# Patient Record
Sex: FEMALE | Race: BLACK OR AFRICAN AMERICAN | Employment: PART TIME | URBAN - METROPOLITAN AREA
[De-identification: names, ages, dates, MRNs, and addresses within clinical notes are randomized per-mention and may not be internally consistent; named-entity substitution may affect disease eponyms.]

---

## 2017-01-13 ENCOUNTER — DOCUMENTATION ONLY (OUTPATIENT)
Dept: NEUROLOGY | Age: 22
End: 2017-01-13

## 2017-01-31 DIAGNOSIS — G40.209 DEJA VU SEIZURE DISORDER (HCC): ICD-10-CM

## 2017-01-31 RX ORDER — LACOSAMIDE 200 MG/1
200 TABLET ORAL 2 TIMES DAILY
Qty: 60 TAB | Refills: 5 | Status: SHIPPED | OUTPATIENT
Start: 2017-01-31 | End: 2017-08-11 | Stop reason: SDUPTHER

## 2017-01-31 NOTE — TELEPHONE ENCOUNTER
Requested Prescriptions     Pending Prescriptions Disp Refills    lacosamide (VIMPAT) 200 mg tab tablet 60 Tab 5     Sig: Take 1 Tab by mouth two (2) times a day. Max Daily Amount: 400 mg.

## 2017-03-23 ENCOUNTER — OFFICE VISIT (OUTPATIENT)
Dept: NEUROLOGY | Age: 22
End: 2017-03-23

## 2017-03-23 VITALS
WEIGHT: 228.4 LBS | HEART RATE: 76 BPM | BODY MASS INDEX: 36.71 KG/M2 | DIASTOLIC BLOOD PRESSURE: 78 MMHG | HEIGHT: 66 IN | OXYGEN SATURATION: 97 % | SYSTOLIC BLOOD PRESSURE: 122 MMHG

## 2017-03-23 DIAGNOSIS — G40.209 DEJA VU SEIZURE DISORDER (HCC): Primary | ICD-10-CM

## 2017-03-23 DIAGNOSIS — R68.89 SPELLS OF DECREASED ATTENTIVENESS: ICD-10-CM

## 2017-03-23 NOTE — LETTER
3/23/2017 12:30 PM 
 
Patient:    Danielle Navarro YOB: 1995 Date of Visit:    3/23/2017 Dear Thelma Ramirez MD 
 
Thank you for referring Ms. Lisette Siu to me for evaluation/treatment. Below are the relevant portions of my assessment and plan of care. NEUROLOGY FOLLOW UP NOTE 
 
03/23/17 Chief Complaint Patient presents with  Follow-up  
  seizure last september 2016 SUBJECTIVE Danielel Navarro is a 19 y.o. female who presented to the neurology office for management of seizures. She started having seizures in 2013 and before the start of the seizure she does have an aura of déjà vu that lasts for 1-2 minutes and then she blacks out which can be from anywhere from 1 minute to 30 minutes. After that when she wakes up she is confused for 10-15 minutes. She has had EEGs in the past which has shown bitemporal sharp wave discharges but no ongoing seizure. She has been tried on Keppra in the past and it did not help her and she is presently taking Vimpat 200 mg p.o. twice daily and Lamictal 25 mg in the morning and 50 mg at night and she has not had any more seizures in the last 6 months. Her last seizure was September 10, 2017. She has also had MRI of the brain which has been normal. 
 
Current Outpatient Prescriptions Medication Sig  
 lacosamide (VIMPAT) 200 mg tab tablet Take 1 Tab by mouth two (2) times a day. Max Daily Amount: 400 mg.  
 lamoTRIgine (LAMICTAL) 25 mg tablet Take  by mouth daily.  FLUTICASONE PROPIONATE (FLONASE NA) by Nasal route.  albuterol (PROVENTIL VENTOLIN) 2.5 mg /3 mL (0.083 %) nebulizer solution by Nebulization route once. No current facility-administered medications for this visit.    
 
 
REVIEW OF SYSTEMS:  
A ten system review of constitutional, cardiovascular, respiratory, musculoskeletal, endocrine, skin, SHEENT, genitourinary, psychiatric and neurologic systems was obtained and is unremarkable except as stated in HPI 
 
 EXAMINATION:  
Visit Vitals  /78  Pulse 76  Ht 5' 6\" (1.676 m)  Wt 228 lb 6.4 oz (103.6 kg)  LMP 03/15/2017  SpO2 97%  BMI 36.86 kg/m2 General:  
General appearance: Pt is in no acute distress Distal pulses are preserved Neurological Examination:  
Mental Status: AAO x3. Speech is fluent. Follows commands, has normal fund of knowledge, attention, short term recall, comprehension and insight. Cranial Nerves: Visual fields are full. PERRL, Extraocular movements are full. Facial sensation intact V1- V3. Facial movement intact, symmetric. Hearing intact to conversation. Palate elevates symmetrically. Shoulder shrug symmetric. Tongue midline. Motor: Strength is 5/5 in all 4 ext. Sensation: Normal to light touch Coordination/Cerebellar: Intact to finger-nose-finger Skin: No significant bruising or lacerations. Laboratory review:  
Results for orders placed or performed in visit on 11/10/14 CBC WITH AUTOMATED DIFF Result Value Ref Range WBC 7.7 3.4 - 10.8 x10E3/uL  
 RBC 4.89 3.77 - 5.28 x10E6/uL HGB 11.5 11.1 - 15.9 g/dL HCT 36.3 34.0 - 46.6 % MCV 74 (L) 79 - 97 fL  
 MCH 23.5 (L) 26.6 - 33.0 pg  
 MCHC 31.7 31.5 - 35.7 g/dL  
 RDW 16.6 (H) 12.3 - 15.4 % PLATELET 846 (H) 765 - 379 x10E3/uL NEUTROPHILS 70 % Lymphocytes 26 % MONOCYTES 4 % EOSINOPHILS 0 % BASOPHILS 0 %  
 ABS. NEUTROPHILS 5.3 1.4 - 7.0 x10E3/uL Abs Lymphocytes 2.0 0.7 - 3.1 x10E3/uL  
 ABS. MONOCYTES 0.3 0.1 - 0.9 x10E3/uL  
 ABS. EOSINOPHILS 0.0 0.0 - 0.4 x10E3/uL  
 ABS. BASOPHILS 0.0 0.0 - 0.2 x10E3/uL IMMATURE GRANULOCYTES 0 %  
 ABS. IMM. GRANS. 0.0 0.0 - 0.1 x10E3/uL METABOLIC PANEL, COMPREHENSIVE Result Value Ref Range Glucose 125 (H) 65 - 99 mg/dL BUN 10 6 - 20 mg/dL Creatinine 0.80 0.57 - 1.00 mg/dL GFR est non- >59 mL/min/1.73 GFR est  >59 mL/min/1.73  
 BUN/Creatinine ratio 13 8 - 20  Sodium 137 134 - 144 mmol/L  
 Potassium 4.0 3.5 - 5.2 mmol/L Chloride 96 (L) 97 - 108 mmol/L  
 CO2 26 18 - 29 mmol/L Calcium 9.3 8.7 - 10.2 mg/dL Protein, total 7.4 6.0 - 8.5 g/dL Albumin 4.2 3.5 - 5.5 g/dL GLOBULIN, TOTAL 3.2 1.5 - 4.5 g/dL A-G Ratio 1.3 1.1 - 2.5 Bilirubin, total <0.2 0.0 - 1.2 mg/dL Alk. phosphatase 92 39 - 117 IU/L  
 AST (SGOT) 19 0 - 40 IU/L  
 ALT (SGPT) 15 0 - 32 IU/L  
LAMOTRIGINE (LAMICTAL) Result Value Ref Range Lamotrigine 1.4 (L) 2.0 - 20.0 ug/mL LACOSAMIDE Result Value Ref Range LACOSAMIDE 6.6 5.0 - 10.0 ug/mL Imaging review: 
5/30/2013 MRI of the brain with and without contrast 
Normal MRI of the head 11/23/2014 
24 hour Holter EEG This is a normal EEG showing no clear focal abnormalities or epileptiform activity. 2/19/2014 Video EEG Day 2 intensive telemetry monitoring EEG with simultaneous video monitoring showed no new events. Interictal EEG showed independent bilateral temporal sharp during sleep and greater on the right. Documentation review I reviewed the documents from Dr. Melva Clarke from December 12, 2016. According to the note patient did have a seizure because she had forgotten to take her medications for about 1-1/2 day. She has also been started having headaches lately. These are new since the Vimpat has been increased. She is presently taking Vimpat 200 mg p.o. twice daily and Lamictal has been decreased to 25 mg in the morning and 50 mg at night. The plan is to continue Vimpat 200 mg p.o. twice daily and wean her off the Lamictal. 
 
Assessment/Plan:  
Luke Burris is a 19 y.o. female who presented to the neurology office for management of seizures. Her last seizure was more than 6  months ago. She is presently taking Lamictal 50 mg in the morning and 25 mg at night and Vimpat 100 mg p.o. twice daily. She has not had any recent seizures. Plan to continue with the medications for now. ICD-10-CM ICD-9-CM 1. Meenu vu seizure disorder ([facility] [address] G40.209 345.40 2. Spells of decreased attentiveness R68.89 780.99 Caro Sesay MD 
Diplomate, American Board of Psychiatry & Neurology (Neurology) [facility] Board of Psychiatry & Neurology (Clinical Neurophysiology) CC: Becky Coughlin MD 
Fax: [phone] This note was created using voice recognition software. Despite editing, there may be syntax errors. This note will not be viewable in [address] If you have questions, please do not hesitate to call me. I look forward to following Ms. Siu along with you. Sincerely, Caro Sesay MD

## 2017-03-23 NOTE — PATIENT INSTRUCTIONS

## 2017-03-23 NOTE — MR AVS SNAPSHOT
Visit Information Date & Time Provider Department Dept. Phone Encounter #  
 3/23/2017 11:00 AM Yordan Steele MD [facility] [phone] [id number] Upcoming Health Maintenance Date Due  
 HPV AGE 9Y-34Y (1 of 3 - Female 3 Dose Series) 9/14/2004 DTaP/Tdap/Td series (1 - Tdap) 9/14/2014 PAP AKA CERVICAL CYTOLOGY 9/14/2014 INFLUENZA AGE 9 TO ADULT 8/1/2016 Allergies as of 3/23/2017  Review Complete On: 3/23/2017 By: Yordan Steele MD  
  
 Severity Noted Reaction Type Reactions Keppra [Levetiracetam]  05/07/2014    Unknown (comments) Patient stated had a reaction. Current Immunizations  Never Reviewed No immunizations on file. Not reviewed this visit Vitals BP Pulse Height(growth percentile) Weight(growth percentile) LMP SpO2  
 122/78 76 5' 6\" (1.676 m) 228 lb 6.4 oz (103.6 kg) 03/15/2017 97% BMI OB Status Smoking Status 36.86 kg/m2 Having regular periods Former Smoker BMI and BSA Data Body Mass Index Body Surface Area  
 36.86 kg/m 2 2.2 m 2 Preferred Pharmacy Pharmacy Name Phone [facility] [id number] [facility] ([place] [phone] Your Updated Medication List  
  
   
This list is accurate as of: 3/23/17 11:40 AM.  Always use your most recent med list.  
  
  
  
  
 albuterol 2.5 mg /3 mL (0.083 %) nebulizer solution Commonly known as:  PROVENTIL VENTOLIN  
by Nebulization route once. FLONASE NA  
by Nasal route. * lacosamide 150 mg Tab tablet Commonly known as:  VIMPAT Take 1 Tab by mouth two (2) times a day. Max Daily Amount: 300 mg.  
  
 * lacosamide 200 mg Tab tablet Commonly known as:  VIMPAT Take 1 Tab by mouth two (2) times a day. Max Daily Amount: 400 mg. LaMICtal 25 mg tablet Generic drug:  lamoTRIgine Take  by mouth daily. * Notice: This list has 2 medication(s) that are the same as other medications prescribed for you. Read the directions carefully, and ask your doctor or other care provider to review them with you. Patient Instructions A Healthy Lifestyle: Care Instructions Your Care Instructions A healthy lifestyle can help you feel good, stay at a healthy weight, and have plenty of energy for both work and play. A healthy lifestyle is something you can share with your whole family. A healthy lifestyle also can lower your risk for serious health problems, such as high blood pressure, heart disease, and diabetes. You can follow a few steps listed below to improve your health and the health of your family. Follow-up care is a key part of your treatment and safety. Be sure to make and go to all appointments, and call your doctor if you are having problems. Its also a good idea to know your test results and keep a list of the medicines you take. How can you care for yourself at home? · Do not eat too much sugar, fat, or fast foods. You can still have dessert and treats now and then. The goal is moderation. · Start small to improve your eating habits. Pay attention to portion sizes, drink less juice and soda pop, and eat more fruits and vegetables. ¨ Eat a healthy amount of food. A 3-ounce serving of meat, for example, is about the size of a deck of cards. Fill the rest of your plate with vegetables and whole grains. ¨ Limit the amount of soda and sports drinks you have every day. Drink more water when you are thirsty. ¨ Eat at least 5 servings of fruits and vegetables every day. It may seem like a lot, but it is not hard to reach this goal. A serving or helping is 1 piece of fruit, 1 cup of vegetables, or 2 cups of leafy, raw vegetables. Have an apple or some carrot sticks as an afternoon snack instead of a candy bar.  Try to have fruits and/or vegetables at every meal. 
 · Make exercise part of your daily routine. You may want to start with simple activities, such as walking, bicycling, or slow swimming. Try to be active 30 to 60 minutes every day. You do not need to do all 30 to 60 minutes all at once. For example, you can exercise 3 times a day for 10 or 20 minutes. Moderate exercise is safe for most people, but it is always a good idea to talk to your doctor before starting an exercise program. 
· Keep moving. Yanelis Dardenis the lawn, work in the garden, or Journeys. Take the stairs instead of the elevator at work. · If you smoke, quit. People who smoke have an increased risk for heart attack, stroke, cancer, and other lung illnesses. Quitting is hard, but there are ways to boost your chance of quitting tobacco for good. ¨ Use nicotine gum, patches, or lozenges. ¨ Ask your doctor about stop-smoking programs and medicines. ¨ Keep trying. In addition to reducing your risk of diseases in the future, you will notice some benefits soon after you stop using tobacco. If you have shortness of breath or asthma symptoms, they will likely get better within a few weeks after you quit. · Limit how much alcohol you drink. Moderate amounts of alcohol (up to 2 drinks a day for men, 1 drink a day for women) are okay. But drinking too much can lead to liver problems, high blood pressure, and other health problems. Family health If you have a family, there are many things you can do together to improve your health. · Eat meals together as a family as often as possible. · Eat healthy foods. This includes fruits, vegetables, lean meats and dairy, and whole grains. · Include your family in your fitness plan. Most people think of activities such as jogging or tennis as the way to fitness, but there are many ways you and your family can be more active. Anything that makes you breathe hard and gets your heart pumping is exercise. Here are some tips: ¨ Walk to do errands or to take your child to school or the bus. ¨ Go for a family bike ride after dinner instead of watching TV. Where can you learn more? Go to http://j luis-christian.info/. Enter W606 in the search box to learn more about \"A Healthy Lifestyle: Care Instructions. \" Current as of: July 26, 2016 Content Version: 11.1 © 3724-9908 [employer]. Care instructions adapted under license by [employer] (which disclaims liability or warranty for this information). If you have questions about a medical condition or this instruction, always ask your healthcare professional. [address] any warranty or liability for your use of this information. Introducing [facility] & HEALTH SERVICES! Dear Gladis Reyes: 
Thank you for requesting a [employer] account. Our records indicate that you already have an active [employer] account. You can access your account anytime at [email or web address] Did you know that you can access your hospital and ER discharge instructions at any time in [employer]? You can also review all of your test results from your hospital stay or ER visit. Additional Information If you have questions, please visit the Frequently Asked Questions section of the [employer] website at [email or web address] Remember, [employer] is NOT to be used for urgent needs. For medical emergencies, dial 911. Now available from your iPhone and Android! Please provide this summary of care documentation to your next provider. Your primary care clinician is listed as Enma Gillespie. If you have any questions after today's visit, please call [phone].

## 2017-03-23 NOTE — PROGRESS NOTES
NEUROLOGY FOLLOW UP NOTE    03/23/17    Chief Complaint   Patient presents with    Follow-up     seizure last september 2016       Raynard Baumgarten is a 19 y.o. female who presented to the neurology office for management of seizures. She started having seizures in 2013 and before the start of the seizure she does have an aura of déjà vu that lasts for 1-2 minutes and then she blacks out which can be from anywhere from 1 minute to 30 minutes. After that when she wakes up she is confused for 10-15 minutes. She has had EEGs in the past which has shown bitemporal sharp wave discharges but no ongoing seizure. She has been tried on Keppra in the past and it did not help her and she is presently taking Vimpat 200 mg p.o. twice daily and Lamictal 25 mg in the morning and 50 mg at night and she has not had any more seizures in the last 6 months. Her last seizure was September 10, 2017. She has also had MRI of the brain which has been normal.    Current Outpatient Prescriptions   Medication Sig    lacosamide (VIMPAT) 200 mg tab tablet Take 1 Tab by mouth two (2) times a day. Max Daily Amount: 400 mg.    lamoTRIgine (LAMICTAL) 25 mg tablet Take  by mouth daily.  FLUTICASONE PROPIONATE (FLONASE NA) by Nasal route.  albuterol (PROVENTIL VENTOLIN) 2.5 mg /3 mL (0.083 %) nebulizer solution by Nebulization route once. No current facility-administered medications for this visit.         REVIEW OF SYSTEMS:   A ten system review of constitutional, cardiovascular, respiratory, musculoskeletal, endocrine, skin, SHEENT, genitourinary, psychiatric and neurologic systems was obtained and is unremarkable except as stated in HPI    EXAMINATION:   Visit Vitals    /78    Pulse 76    Ht 5' 6\" (1.676 m)    Wt 228 lb 6.4 oz (103.6 kg)    LMP 03/15/2017    SpO2 97%    BMI 36.86 kg/m2        General:   General appearance: Pt is in no acute distress   Distal pulses are preserved    Neurological Examination:   Mental Status: AAO x3. Speech is fluent. Follows commands, has normal fund of knowledge, attention, short term recall, comprehension and insight. Cranial Nerves: Visual fields are full. PERRL, Extraocular movements are full. Facial sensation intact V1- V3. Facial movement intact, symmetric. Hearing intact to conversation. Palate elevates symmetrically. Shoulder shrug symmetric. Tongue midline. Motor: Strength is 5/5 in all 4 ext. Sensation: Normal to light touch    Coordination/Cerebellar: Intact to finger-nose-finger     Skin: No significant bruising or lacerations. Laboratory review:   Results for orders placed or performed in visit on 11/10/14   CBC WITH AUTOMATED DIFF   Result Value Ref Range    WBC 7.7 3.4 - 10.8 x10E3/uL    RBC 4.89 3.77 - 5.28 x10E6/uL    HGB 11.5 11.1 - 15.9 g/dL    HCT 36.3 34.0 - 46.6 %    MCV 74 (L) 79 - 97 fL    MCH 23.5 (L) 26.6 - 33.0 pg    MCHC 31.7 31.5 - 35.7 g/dL    RDW 16.6 (H) 12.3 - 15.4 %    PLATELET 965 (H) 736 - 379 x10E3/uL    NEUTROPHILS 70 %    Lymphocytes 26 %    MONOCYTES 4 %    EOSINOPHILS 0 %    BASOPHILS 0 %    ABS. NEUTROPHILS 5.3 1.4 - 7.0 x10E3/uL    Abs Lymphocytes 2.0 0.7 - 3.1 x10E3/uL    ABS. MONOCYTES 0.3 0.1 - 0.9 x10E3/uL    ABS. EOSINOPHILS 0.0 0.0 - 0.4 x10E3/uL    ABS. BASOPHILS 0.0 0.0 - 0.2 x10E3/uL    IMMATURE GRANULOCYTES 0 %    ABS. IMM.  GRANS. 0.0 0.0 - 0.1 [id number]   METABOLIC PANEL, COMPREHENSIVE   Result Value Ref Range    Glucose 125 (H) 65 - 99 mg/dL    BUN 10 6 - 20 mg/dL    Creatinine 0.80 0.57 - 1.00 mg/dL    GFR est non- >59 mL/min/1.73    GFR est  >59 mL/min/1.73    BUN/Creatinine ratio 13 8 - 20    Sodium 137 134 - 144 mmol/L    Potassium 4.0 3.5 - 5.2 mmol/L    Chloride 96 (L) 97 - 108 mmol/L    CO2 26 18 - 29 mmol/L    Calcium 9.3 8.7 - 10.2 mg/dL    Protein, total 7.4 6.0 - 8.5 g/dL    Albumin 4.2 3.5 - 5.5 g/dL    GLOBULIN, TOTAL 3.2 1.5 - 4.5 g/dL    A-G Ratio 1.3 1.1 - 2.5    Bilirubin, total <0.2 0.0 - 1.2 mg/dL    Alk. phosphatase 92 39 - 117 IU/L    AST (SGOT) 19 0 - 40 IU/L    ALT (SGPT) 15 0 - 32 IU/L   LAMOTRIGINE (LAMICTAL)   Result Value Ref Range    Lamotrigine 1.4 (L) 2.0 - 20.0 ug/mL   LACOSAMIDE   Result Value Ref Range    LACOSAMIDE 6.6 5.0 - 10.0 ug/mL       Imaging review:  5/30/2013  MRI of the brain with and without contrast  Normal MRI of the head     11/23/2014  24 hour Holter EEG  This is a normal EEG showing no clear focal abnormalities or epileptiform activity. 2/19/2014   Video EEG  Day 2 intensive telemetry monitoring EEG with simultaneous video monitoring showed no new events. Interictal EEG showed independent bilateral temporal sharp during sleep and greater on the right. Documentation review  I reviewed the documents from Dr. Vita Solorzano from December 12, 2016. According to the note patient did have a seizure because she had forgotten to take her medications for about 1-1/2 day. She has also been started having headaches lately. These are new since the Vimpat has been increased. She is presently taking Vimpat 200 mg p.o. twice daily and Lamictal has been decreased to 25 mg in the morning and 50 mg at night. The plan is to continue Vimpat 200 mg p.o. twice daily and wean her off the Lamictal.    Assessment/Plan:   Luisa Lord is a 19 y.o. female who presented to the neurology office for management of seizures. Her last seizure was more than 6  months ago. She is presently taking Lamictal 50 mg in the morning and 25 mg at night and Vimpat 100 mg p.o. twice daily. She has not had any recent seizures. Plan to continue with the medications for now. ICD-10-CM ICD-9-CM    1. Meenu vu seizure disorder ([facility] G40.209 345.40    2.  Spells of decreased attentiveness R68.89 780.99         Suhas Melissa MD  Diplomate, American Board of Psychiatry & Neurology (Neurology)  Diplomate, American Board of Psychiatry & Neurology (Clinical Neurophysiology)    CC: Eulalio Singh MD  Fax: [phone]    This note was created using voice recognition software. Despite editing, there may be syntax errors. This note will not be viewable in [address]

## 2017-03-24 ENCOUNTER — DOCUMENTATION ONLY (OUTPATIENT)
Dept: NEUROLOGY | Age: 22
End: 2017-03-24

## 2017-07-12 ENCOUNTER — OFFICE VISIT (OUTPATIENT)
Dept: NEUROLOGY | Age: 22
End: 2017-07-12

## 2017-07-12 VITALS
BODY MASS INDEX: 39.12 KG/M2 | DIASTOLIC BLOOD PRESSURE: 80 MMHG | SYSTOLIC BLOOD PRESSURE: 138 MMHG | WEIGHT: 243.4 LBS | HEIGHT: 66 IN | HEART RATE: 75 BPM | OXYGEN SATURATION: 98 %

## 2017-07-12 DIAGNOSIS — G40.209 DEJA VU SEIZURE DISORDER (HCC): Primary | ICD-10-CM

## 2017-07-12 DIAGNOSIS — R68.89 SPELLS OF DECREASED ATTENTIVENESS: ICD-10-CM

## 2017-07-12 NOTE — MR AVS SNAPSHOT
Visit Information Date & Time Provider Department Dept. Phone Encounter #  
 7/12/2017  2:40 PM Sebastien Hoffman MD [facility] [phone] [id number] Upcoming Health Maintenance Date Due  
 HPV AGE 9Y-34Y (1 of 3 - Female 3 Dose Series) 9/14/2004 DTaP/Tdap/Td series (1 - Tdap) 9/14/2014 PAP AKA CERVICAL CYTOLOGY 9/14/2014 INFLUENZA AGE 9 TO ADULT 8/1/2017 Allergies as of 7/12/2017  Review Complete On: 7/12/2017 By: Santiago Johnson Severity Noted Reaction Type Reactions Keppra [Levetiracetam]  05/07/2014    Unknown (comments) Patient stated had a reaction. Current Immunizations  Never Reviewed No immunizations on file. Not reviewed this visit Vitals BP Pulse Height(growth percentile) Weight(growth percentile) SpO2 BMI  
 138/80 75 5' 6\" (1.676 m) 243 lb 6.4 oz (110.4 kg) 98% 39.29 kg/m2 OB Status Smoking Status Having regular periods Former Smoker BMI and BSA Data Body Mass Index Body Surface Area  
 39.29 kg/m 2 2.27 m 2 Preferred Pharmacy Pharmacy Name Phone [facility] [address] [place] [address] [phone] Your Updated Medication List  
  
   
This list is accurate as of: 7/12/17  2:50 PM.  Always use your most recent med list.  
  
  
  
  
 albuterol 2.5 mg /3 mL (0.083 %) nebulizer solution Commonly known as:  PROVENTIL VENTOLIN  
by Nebulization route once. FLONASE NA  
by Nasal route. lacosamide 200 mg Tab tablet Commonly known as:  VIMPAT Take 1 Tab by mouth two (2) times a day. Max Daily Amount: 400 mg. LaMICtal 25 mg tablet Generic drug:  lamoTRIgine Take  by mouth daily. Patient Instructions Follow-up in 6 months A Healthy Lifestyle: Care Instructions Your Care Instructions A healthy lifestyle can help you feel good, stay at a healthy weight, and have plenty of energy for both work and play. A healthy lifestyle is something you can share with your whole family. A healthy lifestyle also can lower your risk for serious health problems, such as high blood pressure, heart disease, and diabetes. You can follow a few steps listed below to improve your health and the health of your family. Follow-up care is a key part of your treatment and safety. Be sure to make and go to all appointments, and call your doctor if you are having problems. Its also a good idea to know your test results and keep a list of the medicines you take. How can you care for yourself at home? · Do not eat too much sugar, fat, or fast foods. You can still have dessert and treats now and then. The goal is moderation. · Start small to improve your eating habits. Pay attention to portion sizes, drink less juice and soda pop, and eat more fruits and vegetables. ¨ Eat a healthy amount of food. A 3-ounce serving of meat, for example, is about the size of a deck of cards. Fill the rest of your plate with vegetables and whole grains. ¨ Limit the amount of soda and sports drinks you have every day. Drink more water when you are thirsty. ¨ Eat at least 5 servings of fruits and vegetables every day. It may seem like a lot, but it is not hard to reach this goal. A serving or helping is 1 piece of fruit, 1 cup of vegetables, or 2 cups of leafy, raw vegetables. Have an apple or some carrot sticks as an afternoon snack instead of a candy bar. Try to have fruits and/or vegetables at every meal. 
· Make exercise part of your daily routine. You may want to start with simple activities, such as walking, bicycling, or slow swimming. Try to be active 30 to 60 minutes every day. You do not need to do all 30 to 60 minutes all at once.  For example, you can exercise 3 times a day for 10 or 20 minutes. Moderate exercise is safe for most people, but it is always a good idea to talk to your doctor before starting an exercise program. 
· Keep moving. Kailey Puff the lawn, work in the garden, or [place]. Take the stairs instead of the elevator at work. · If you smoke, quit. People who smoke have an increased risk for heart attack, stroke, cancer, and other lung illnesses. Quitting is hard, but there are ways to boost your chance of quitting tobacco for good. ¨ Use nicotine gum, patches, or lozenges. ¨ Ask your doctor about stop-smoking programs and medicines. ¨ Keep trying. In addition to reducing your risk of diseases in the future, you will notice some benefits soon after you stop using tobacco. If you have shortness of breath or asthma symptoms, they will likely get better within a few weeks after you quit. · Limit how much alcohol you drink. Moderate amounts of alcohol (up to 2 drinks a day for men, 1 drink a day for women) are okay. But drinking too much can lead to liver problems, high blood pressure, and other health problems. Family health If you have a family, there are many things you can do together to improve your health. · Eat meals together as a family as often as possible. · Eat healthy foods. This includes fruits, vegetables, lean meats and dairy, and whole grains. · Include your family in your fitness plan. Most people think of activities such as jogging or tennis as the way to fitness, but there are many ways you and your family can be more active. Anything that makes you breathe hard and gets your heart pumping is exercise. Here are some tips: 
¨ Walk to do errands or to take your child to school or the bus. ¨ Go for a family bike ride after dinner instead of watching TV. Where can you learn more? Go to http://j luis-christian.info/. Enter F533 in the search box to learn more about \"A Healthy Lifestyle: Care Instructions. \" Current as of: July 26, 2016 Content Version: 11.3 © 6943-0343 [employer], [employer]. Care instructions adapted under license by [employer] (which disclaims liability or warranty for this information). If you have questions about a medical condition or this instruction, always ask your healthcare professional. [address] any warranty or liability for your use of this information. Introducing [facility]! Dear Elizabeth Smith: 
Thank you for requesting a Yagomart account. Our records indicate that you already have an active Yagomart account. You can access your account anytime at [email or web address] Did you know that you can access your hospital and ER discharge instructions at any time in Yagomart? You can also review all of your test results from your hospital stay or ER visit. Additional Information If you have questions, please visit the Frequently Asked Questions section of the Yagomart website at [email or web address] Remember, Yagomart is NOT to be used for urgent needs. For medical emergencies, dial 911. Now available from your iPhone and Android! Please provide this summary of care documentation to your next provider. Your primary care clinician is listed as Ovidio Ballesteros. If you have any questions after today's visit, please call [phone].

## 2017-07-12 NOTE — PATIENT INSTRUCTIONS
Follow-up in 6 months      A Healthy Lifestyle: Care Instructions  Your Care Instructions  A healthy lifestyle can help you feel good, stay at a healthy weight, and have plenty of energy for both work and play. A healthy lifestyle is something you can share with your whole family. A healthy lifestyle also can lower your risk for serious health problems, such as high blood pressure, heart disease, and diabetes. You can follow a few steps listed below to improve your health and the health of your family. Follow-up care is a key part of your treatment and safety. Be sure to make and go to all appointments, and call your doctor if you are having problems. Its also a good idea to know your test results and keep a list of the medicines you take. How can you care for yourself at home? · Do not eat too much sugar, fat, or fast foods. You can still have dessert and treats now and then. The goal is moderation. · Start small to improve your eating habits. Pay attention to portion sizes, drink less juice and soda pop, and eat more fruits and vegetables. ¨ Eat a healthy amount of food. A 3-ounce serving of meat, for example, is about the size of a deck of cards. Fill the rest of your plate with vegetables and whole grains. ¨ Limit the amount of soda and sports drinks you have every day. Drink more water when you are thirsty. ¨ Eat at least 5 servings of fruits and vegetables every day. It may seem like a lot, but it is not hard to reach this goal. A serving or helping is 1 piece of fruit, 1 cup of vegetables, or 2 cups of leafy, raw vegetables. Have an apple or some carrot sticks as an afternoon snack instead of a candy bar. Try to have fruits and/or vegetables at every meal.  · Make exercise part of your daily routine. You may want to start with simple activities, such as walking, bicycling, or slow swimming. Try to be active 30 to 60 minutes every day. You do not need to do all 30 to 60 minutes all at once.  For example, you can exercise 3 times a day for 10 or 20 minutes. Moderate exercise is safe for most people, but it is always a good idea to talk to your doctor before starting an exercise program.  · Keep moving. Emil Bells the lawn, work in the garden, or [place]. Take the stairs instead of the elevator at work. · If you smoke, quit. People who smoke have an increased risk for heart attack, stroke, cancer, and other lung illnesses. Quitting is hard, but there are ways to boost your chance of quitting tobacco for good. ¨ Use nicotine gum, patches, or lozenges. ¨ Ask your doctor about stop-smoking programs and medicines. ¨ Keep trying. In addition to reducing your risk of diseases in the future, you will notice some benefits soon after you stop using tobacco. If you have shortness of breath or asthma symptoms, they will likely get better within a few weeks after you quit. · Limit how much alcohol you drink. Moderate amounts of alcohol (up to 2 drinks a day for men, 1 drink a day for women) are okay. But drinking too much can lead to liver problems, high blood pressure, and other health problems. Family health  If you have a family, there are many things you can do together to improve your health. · Eat meals together as a family as often as possible. · Eat healthy foods. This includes fruits, vegetables, lean meats and dairy, and whole grains. · Include your family in your fitness plan. Most people think of activities such as jogging or tennis as the way to fitness, but there are many ways you and your family can be more active. Anything that makes you breathe hard and gets your heart pumping is exercise. Here are some tips:  ¨ Walk to do errands or to take your child to school or the bus. ¨ Go for a family bike ride after dinner instead of watching TV. Where can you learn more? Go to http://j luis-christian.info/.   Enter F106 in the search box to learn more about \"A Healthy Lifestyle: Care Instructions. \"  Current as of: July 26, 2016  Content Version: 11.3  © 8282-8979 [employer], [employer]. Care instructions adapted under license by [employer] (which disclaims liability or warranty for this information). If you have questions about a medical condition or this instruction, always ask your healthcare professional. [place] [address] any warranty or liability for your use of this information.

## 2017-07-12 NOTE — PROGRESS NOTES
NEUROLOGY FOLLOW UP NOTE    07/12/17    Chief Complaint   Patient presents with    Follow-up     Seizures none       Luciano Christensen is a 19 y.o. female who presented to the neurology office for management of seizures. She started having seizures in 2013 and before the start of the seizure she does have an aura of déjà vu that lasts for 1-2 minutes and then she blacks out which can be from anywhere from 1 minute to 30 minutes. After that when she wakes up she is confused for 10-15 minutes. She has had EEGs in the past which has shown bitemporal sharp wave discharges but no ongoing seizure. She has been tried on Keppra in the past and it did not help her and she is presently taking Vimpat 200 mg p.o. twice daily and Lamictal 25 mg in the morning and 50 mg at night and she has not had any more seizures in the last 6 months. Her last seizure was September 10, 2016. She has also had MRI of the brain which has been normal.    Current Outpatient Prescriptions   Medication Sig    lacosamide (VIMPAT) 200 mg tab tablet Take 1 Tab by mouth two (2) times a day. Max Daily Amount: 400 mg.    lamoTRIgine (LAMICTAL) 25 mg tablet Take  by mouth daily.  FLUTICASONE PROPIONATE (FLONASE NA) by Nasal route.  albuterol (PROVENTIL VENTOLIN) 2.5 mg /3 mL (0.083 %) nebulizer solution by Nebulization route once. No current facility-administered medications for this visit. REVIEW OF SYSTEMS:   A ten system review of constitutional, cardiovascular, respiratory, musculoskeletal, endocrine, skin, SHEENT, genitourinary, psychiatric and neurologic systems was obtained and is unremarkable except as stated in HPI    EXAMINATION:   Visit Vitals    /80    Pulse 75    Ht 5' 6\" (1.676 m)    Wt 243 lb 6.4 oz (110.4 kg)    SpO2 98%    BMI 39.29 kg/m2        General:   General appearance: Pt is in no acute distress   Distal pulses are preserved    Neurological Examination:   Mental Status: AAO x3. Speech is fluent. Follows commands, has normal fund of knowledge, attention, short term recall, comprehension and insight. Cranial Nerves: Visual fields are full. PERRL, Extraocular movements are full. Facial sensation intact V1- V3. Facial movement intact, symmetric. Hearing intact to conversation. Palate elevates symmetrically. Shoulder shrug symmetric. Tongue midline. Motor: Strength is 5/5 in all 4 ext. Sensation: Normal to light touch    Coordination/Cerebellar: Intact to finger-nose-finger     Skin: No significant bruising or lacerations. Laboratory review:   Results for orders placed or performed in visit on 11/10/14   CBC WITH AUTOMATED DIFF   Result Value Ref Range    WBC 7.7 3.4 - 10.8 x10E3/uL    RBC 4.89 3.77 - 5.28 x10E6/uL    HGB 11.5 11.1 - 15.9 g/dL    HCT 36.3 34.0 - 46.6 %    MCV 74 (L) 79 - 97 fL    MCH 23.5 (L) 26.6 - 33.0 pg    MCHC 31.7 31.5 - 35.7 g/dL    RDW 16.6 (H) 12.3 - 15.4 %    PLATELET 122 (H) 943 - 379 x10E3/uL    NEUTROPHILS 70 %    Lymphocytes 26 %    MONOCYTES 4 %    EOSINOPHILS 0 %    BASOPHILS 0 %    ABS. NEUTROPHILS 5.3 1.4 - 7.0 x10E3/uL    Abs Lymphocytes 2.0 0.7 - 3.1 x10E3/uL    ABS. MONOCYTES 0.3 0.1 - 0.9 x10E3/uL    ABS. EOSINOPHILS 0.0 0.0 - 0.4 x10E3/uL    ABS. BASOPHILS 0.0 0.0 - 0.2 x10E3/uL    IMMATURE GRANULOCYTES 0 %    ABS. IMM. GRANS. 0.0 0.0 - 0.1 [id number]   METABOLIC PANEL, COMPREHENSIVE   Result Value Ref Range    Glucose 125 (H) 65 - 99 mg/dL    BUN 10 6 - 20 mg/dL    Creatinine 0.80 0.57 - 1.00 mg/dL    GFR est non- >59 mL/min/1.73    GFR est  >59 mL/min/1.73    BUN/Creatinine ratio 13 8 - 20    Sodium 137 134 - 144 mmol/L    Potassium 4.0 3.5 - 5.2 mmol/L    Chloride 96 (L) 97 - 108 mmol/L    CO2 26 18 - 29 mmol/L    Calcium 9.3 8.7 - 10.2 mg/dL    Protein, total 7.4 6.0 - 8.5 g/dL    Albumin 4.2 3.5 - 5.5 g/dL    GLOBULIN, TOTAL 3.2 1.5 - 4.5 g/dL    A-G Ratio 1.3 1.1 - 2.5    Bilirubin, total <0.2 0.0 - 1.2 mg/dL    Alk. phosphatase 92 39 - 117 IU/L    AST (SGOT) 19 0 - 40 IU/L    ALT (SGPT) 15 0 - 32 IU/L   LAMOTRIGINE (LAMICTAL)   Result Value Ref Range    Lamotrigine 1.4 (L) 2.0 - 20.0 ug/mL   LACOSAMIDE   Result Value Ref Range    LACOSAMIDE 6.6 5.0 - 10.0 ug/mL       Imaging review:  5/30/2013  MRI of the brain with and without contrast  Normal MRI of the head     11/23/2014  24 hour Holter EEG  This is a normal EEG showing no clear focal abnormalities or epileptiform activity. 2/19/2014   Video EEG  Day 2 intensive telemetry monitoring EEG with simultaneous video monitoring showed no new events. Interictal EEG showed independent bilateral temporal sharp during sleep and greater on the right. Documentation review  I reviewed the documents from Dr. Laquita Serra from December 12, 2016. According to the note patient did have a seizure because she had forgotten to take her medications for about 1-1/2 day. She has also been started having headaches lately. These are new since the Vimpat has been increased. She is presently taking Vimpat 200 mg p.o. twice daily and Lamictal has been decreased to 25 mg in the morning and 50 mg at night. The plan is to continue Vimpat 200 mg p.o. twice daily and wean her off the Lamictal.    Assessment/Plan:   Debbie Riggs is a 19 y.o. female who presented to the neurology office for management of seizures. Her last seizure was more than 6  months ago. She is presently taking Lamictal 50 mg in the morning and 25 mg at night and Vimpat 200 mg p.o. twice daily. She has not had any recent seizures. Plan to continue with the medications for now. ICD-10-CM ICD-9-CM    1. Meenu vu seizure disorder ([facility] G40.209 345.40    2. Spells of decreased attentiveness R68.89 780.99       Over 25 minutes was spent with the patient and family of which > 50% of the visit was spent counseling on diagnosis, management, and treatment of the diagnosis. I did discuss about her seizures.   She is mildly hypertensive and has been told by the primary care physician to start a diuretic. The patient is trying to avoid it. Patrick Beyer MD  Diplomate, American Board of Psychiatry & Neurology (Neurology)  [place] Board of Psychiatry & Neurology (Clinical Neurophysiology)    CC: Edgard Lobo MD  Fax: [phone]    This note was created using voice recognition software. Despite editing, there may be syntax errors. This note will not be viewable in [address]

## 2017-07-12 NOTE — LETTER
7/12/2017 2:52 PM 
 
Patient:    Eli Westbrook YOB: 1995 Date of Visit:    7/12/2017 Dear Óscar Recinos MD 
 
Thank you for referring Ms. Lisette Siu to me for evaluation/treatment. Below are the relevant portions of my assessment and plan of care. NEUROLOGY FOLLOW UP NOTE 
 
07/12/17 Chief Complaint Patient presents with  Follow-up Seizures none SUBJECTIVE Eli Westbrook is a 19 y.o. female who presented to the neurology office for management of seizures. She started having seizures in 2013 and before the start of the seizure she does have an aura of déjà vu that lasts for 1-2 minutes and then she blacks out which can be from anywhere from 1 minute to 30 minutes. After that when she wakes up she is confused for 10-15 minutes. She has had EEGs in the past which has shown bitemporal sharp wave discharges but no ongoing seizure. She has been tried on Keppra in the past and it did not help her and she is presently taking Vimpat 200 mg p.o. twice daily and Lamictal 25 mg in the morning and 50 mg at night and she has not had any more seizures in the last 6 months. Her last seizure was September 10, 2016. She has also had MRI of the brain which has been normal. 
 
Current Outpatient Prescriptions Medication Sig  
 lacosamide (VIMPAT) 200 mg tab tablet Take 1 Tab by mouth two (2) times a day. Max Daily Amount: 400 mg.  
 lamoTRIgine (LAMICTAL) 25 mg tablet Take  by mouth daily.  FLUTICASONE PROPIONATE (FLONASE NA) by Nasal route.  albuterol (PROVENTIL VENTOLIN) 2.5 mg /3 mL (0.083 %) nebulizer solution by Nebulization route once. No current facility-administered medications for this visit. REVIEW OF SYSTEMS:  
A ten system review of constitutional, cardiovascular, respiratory, musculoskeletal, endocrine, skin, SHEENT, genitourinary, psychiatric and neurologic systems was obtained and is unremarkable except as stated in HPI EXAMINATION:  
 Visit Vitals  /80  Pulse 75  Ht 5' 6\" (1.676 m)  Wt 243 lb 6.4 oz (110.4 kg)  SpO2 98%  BMI 39.29 kg/m2 General:  
General appearance: Pt is in no acute distress Distal pulses are preserved Neurological Examination:  
Mental Status: AAO x3. Speech is fluent. Follows commands, has normal fund of knowledge, attention, short term recall, comprehension and insight. Cranial Nerves: Visual fields are full. PERRL, Extraocular movements are full. Facial sensation intact V1- V3. Facial movement intact, symmetric. Hearing intact to conversation. Palate elevates symmetrically. Shoulder shrug symmetric. Tongue midline. Motor: Strength is 5/5 in all 4 ext. Sensation: Normal to light touch Coordination/Cerebellar: Intact to finger-nose-finger Skin: No significant bruising or lacerations. Laboratory review:  
Results for orders placed or performed in visit on 11/10/14 CBC WITH AUTOMATED DIFF Result Value Ref Range WBC 7.7 3.4 - 10.8 x10E3/uL  
 RBC 4.89 3.77 - 5.28 x10E6/uL HGB 11.5 11.1 - 15.9 g/dL HCT 36.3 34.0 - 46.6 % MCV 74 (L) 79 - 97 fL  
 MCH 23.5 (L) 26.6 - 33.0 pg  
 MCHC 31.7 31.5 - 35.7 g/dL  
 RDW 16.6 (H) 12.3 - 15.4 % PLATELET 085 (H) 424 - 379 x10E3/uL NEUTROPHILS 70 % Lymphocytes 26 % MONOCYTES 4 % EOSINOPHILS 0 % BASOPHILS 0 %  
 ABS. NEUTROPHILS 5.3 1.4 - 7.0 x10E3/uL Abs Lymphocytes 2.0 0.7 - 3.1 x10E3/uL  
 ABS. MONOCYTES 0.3 0.1 - 0.9 x10E3/uL  
 ABS. EOSINOPHILS 0.0 0.0 - 0.4 x10E3/uL  
 ABS. BASOPHILS 0.0 0.0 - 0.2 x10E3/uL IMMATURE GRANULOCYTES 0 %  
 ABS. IMM. GRANS. 0.0 0.0 - 0.1 x10E3/uL METABOLIC PANEL, COMPREHENSIVE Result Value Ref Range Glucose 125 (H) 65 - 99 mg/dL BUN 10 6 - 20 mg/dL Creatinine 0.80 0.57 - 1.00 mg/dL GFR est non- >59 mL/min/1.73 GFR est  >59 mL/min/1.73  
 BUN/Creatinine ratio 13 8 - 20 Sodium 137 134 - 144 mmol/L  Potassium 4.0 3.5 - 5.2 mmol/L  
 Chloride 96 (L) 97 - 108 mmol/L  
 CO2 26 18 - 29 mmol/L Calcium 9.3 8.7 - 10.2 mg/dL Protein, total 7.4 6.0 - 8.5 g/dL Albumin 4.2 3.5 - 5.5 g/dL GLOBULIN, TOTAL 3.2 1.5 - 4.5 g/dL A-G Ratio 1.3 1.1 - 2.5 Bilirubin, total <0.2 0.0 - 1.2 mg/dL Alk. phosphatase 92 39 - 117 IU/L  
 AST (SGOT) 19 0 - 40 IU/L  
 ALT (SGPT) 15 0 - 32 IU/L  
LAMOTRIGINE (LAMICTAL) Result Value Ref Range Lamotrigine 1.4 (L) 2.0 - 20.0 ug/mL LACOSAMIDE Result Value Ref Range LACOSAMIDE 6.6 5.0 - 10.0 ug/mL Imaging review: 
5/30/2013 MRI of the brain with and without contrast 
Normal MRI of the head 11/23/2014 
24 hour Holter EEG This is a normal EEG showing no clear focal abnormalities or epileptiform activity. 2/19/2014 Video EEG Day 2 intensive telemetry monitoring EEG with simultaneous video monitoring showed no new events. Interictal EEG showed independent bilateral temporal sharp during sleep and greater on the right. Documentation review I reviewed the documents from Dr. Lionel Juarez from December 12, 2016. According to the note patient did have a seizure because she had forgotten to take her medications for about 1-1/2 day. She has also been started having headaches lately. These are new since the Vimpat has been increased. She is presently taking Vimpat 200 mg p.o. twice daily and Lamictal has been decreased to 25 mg in the morning and 50 mg at night. The plan is to continue Vimpat 200 mg p.o. twice daily and wean her off the Lamictal. 
 
Assessment/Plan:  
Vandana Mcleod is a 19 y.o. female who presented to the neurology office for management of seizures. Her last seizure was more than 6  months ago. She is presently taking Lamictal 50 mg in the morning and 25 mg at night and Vimpat 200 mg p.o. twice daily. She has not had any recent seizures. Plan to continue with the medications for now. ICD-10-CM ICD-9-CM 1. Meenu arceo seizure disorder ([facility].) G40.209 345.40 2. Spells of decreased attentiveness R68.89 780.99 Over 25 minutes was spent with the patient and family of which > 50% of the visit was spent counseling on diagnosis, management, and treatment of the diagnosis. I did discuss about her seizures. She is mildly hypertensive and has been told by the primary care physician to start a diuretic. The patient is trying to avoid it. Ines Ricks MD 
Diplomate, American Board of Psychiatry & Neurology (Neurology) Lavena Fabry Board of Psychiatry & Neurology (Clinical Neurophysiology) CC: Pee Moore MD 
Fax: [phone] This note was created using voice recognition software. Despite editing, there may be syntax errors. This note will not be viewable in [address] If you have questions, please do not hesitate to call me. I look forward to following Ms. Siu along with you. Sincerely, Ines Ricks MD

## 2017-09-26 ENCOUNTER — TELEPHONE (OUTPATIENT)
Dept: NEUROLOGY | Age: 22
End: 2017-09-26

## 2017-09-26 NOTE — TELEPHONE ENCOUNTER
Spoke with patient to let her known her DMV forms will be ready on 9/27/2017, will faxed to [facility] and mail her a copy.

## 2017-09-28 ENCOUNTER — DOCUMENTATION ONLY (OUTPATIENT)
Dept: NEUROLOGY | Age: 22
End: 2017-09-28

## 2017-09-28 NOTE — PROGRESS NOTES
Faxed on 9/27/2017 to [facility] patients forms and I have mailed the patient a copy as well. Faxed to [phone].

## 2017-11-13 DIAGNOSIS — G40.209 DEJA VU SEIZURE DISORDER (HCC): ICD-10-CM

## 2017-11-13 RX ORDER — LACOSAMIDE 200 MG/1
TABLET, FILM COATED ORAL
Qty: 180 TAB | Refills: 0 | Status: SHIPPED | OUTPATIENT
Start: 2017-11-13 | End: 2017-11-14 | Stop reason: SDUPTHER

## 2017-11-14 DIAGNOSIS — G40.209 DEJA VU SEIZURE DISORDER (HCC): ICD-10-CM

## 2017-11-14 RX ORDER — LACOSAMIDE 200 MG/1
TABLET ORAL
Qty: 180 TAB | Refills: 1 | Status: SHIPPED | OUTPATIENT
Start: 2017-11-14

## 2017-11-14 NOTE — TELEPHONE ENCOUNTER
Future Appointments  Date Time Provider Mauricio Sanders   1/15/2018 2:40 Abby Duffy MD [address]                         Last Appointment My Department:  7/12/2017    Please advise of refill below. Was signed by Dr Tolu Pierson. Can you please sign so that it can be faxed under your name?

## 2018-01-12 ENCOUNTER — TELEPHONE (OUTPATIENT)
Dept: NEUROLOGY | Age: 23
End: 2018-01-12

## 2018-01-12 NOTE — TELEPHONE ENCOUNTER
----- Message from Chris Escalante sent at 1/12/2018  1:44 PM EST -----  Regarding: /Telephone  Pt is requesting a call back in regards tests and how much out of pocket. Best contact [phone].

## 2018-01-15 NOTE — TELEPHONE ENCOUNTER
Spoke with patient and ask her to call her insurance regarding her question regarding cost due to the fact that patient states that Dr. 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

## 2018-08-21 ENCOUNTER — TELEPHONE (OUTPATIENT)
Dept: NEUROLOGY | Age: 23
End: 2018-08-21

## 2018-08-21 NOTE — TELEPHONE ENCOUNTER
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 Pt requested that the records be sent on 7/30/18. [facility] has advised the pt that the records have not been received yet. Pt would like to schedule an appt with [facility], but can't until they receive her records. Pt can be reached at [phone].

## 2018-08-22 NOTE — TELEPHONE ENCOUNTER
Left voicemail concerning her medical records being sent to [facility]. She needs to call  [facility] records [phone] to find out where they are at in the process of sending them. We have requested them to be sent since 7/30/18.

## 2020-08-12 ENCOUNTER — OFFICE VISIT (OUTPATIENT)
Dept: PRIMARY CARE CLINIC | Age: 25
End: 2020-08-12
Payer: COMMERCIAL

## 2020-08-12 VITALS
TEMPERATURE: 97.2 F | SYSTOLIC BLOOD PRESSURE: 128 MMHG | OXYGEN SATURATION: 98 % | DIASTOLIC BLOOD PRESSURE: 82 MMHG | HEART RATE: 90 BPM

## 2020-08-12 DIAGNOSIS — Z13.83 SCREENING FOR CARDIOVASCULAR, RESPIRATORY, AND GENITOURINARY DISEASES: Primary | ICD-10-CM

## 2020-08-12 DIAGNOSIS — Z13.6 SCREENING FOR CARDIOVASCULAR, RESPIRATORY, AND GENITOURINARY DISEASES: Primary | ICD-10-CM

## 2020-08-12 DIAGNOSIS — Z13.89 SCREENING FOR CARDIOVASCULAR, RESPIRATORY, AND GENITOURINARY DISEASES: Primary | ICD-10-CM

## 2020-08-12 DIAGNOSIS — R05.9 COUGH: ICD-10-CM

## 2020-08-12 PROCEDURE — 99213 OFFICE O/P EST LOW 20 MIN: CPT | Performed by: NURSE PRACTITIONER

## 2020-08-12 NOTE — PROGRESS NOTES
Taiwo Villa is a 30 y.o. female who was seen in clinic today (8/12/2020) for an acute visit. Assessment/Plan:     There is a low to med probability that this is COVID-19. * COVID-19 sample collected and submitted       * Patient given detailed CDC instructions contained within After Visit Summary    Diagnoses and all orders for this visit:    1. Screening for cardiovascular, respiratory, and genitourinary diseases  -     NOVEL CORONAVIRUS (COVID-19)    2. Cough       Possible covid exposure. Discussed expected course/resolution/complications of diagnosis in detail with patient. Advised pt on CDC guidance, OTC medications for symptom management, red flags reviewed and should develop to seek emergency medical attn. Encouraged pt to maintain health through a balanced diet, high in fiber and plants;small freq meals if any nausea; staying well hydrated by drinking water or occ low sugar non carbonated beverages; reviewed importance of proper sleep habitus, 7-8hrs of rest; and emphasized moderate exercise 30 mins a day/150mins a week. Reviewed with her that COVID-19 pandemic is an evolving situation with rapidly changing recommendations & guidelines, continue to practice hand hygiene, social distancing, wearing of facial coverings. Medical decisions are made based on the the best information available at the time. Recommended she stay tuned for updates published by trusted sources and to advise your PCP of any unexpected changes in clinical condition     Subjective:   Lisette was seen today for Concern For COVID-19 (Coronavirus)    Exposure occurred approx 1 week ago, contact was brief. She denies a recent history/current: fever, wheezing, SOB, and FINLEY. Non user of tob.   Hx of asthma allergies and a baseline cough, she thinks might be a little worse than typical.       Travel Screening     Question   Response    In the last month, have you been in contact with someone who was confirmed or suspected to have Coronavirus / COVID-19? Yes    Do you have any of the following symptoms? None of these    Have you traveled internationally in the last month? No      Travel History   Travel since 07/12/20     No documented travel since 07/12/20          ROS - Pertinent items are noted in HPI    Patient Active Problem List   Diagnosis Code    Spells of decreased attentiveness R68.89    Meenu arceo seizure disorder ([facility] [address] G40.209    Depression F32.9    Anxiety F41.9     Home Medications    Medication Sig Start Date End Date Taking? Authorizing Provider   lacosamide (VIMPAT) 200 mg tab tablet Take 1 tablet by mouth twice a day 11/14/17   Natalie Blake MD   lamoTRIgine (LAMICTAL) 25 mg tablet Take  by mouth daily. Provider, Historical   FLUTICASONE PROPIONATE (FLONASE NA) by Nasal route. Provider, Historical   albuterol (PROVENTIL VENTOLIN) 2.5 mg /3 mL (0.083 %) nebulizer solution by Nebulization route once. Provider, Historical      Allergies   Allergen Reactions    Keppra [Levetiracetam] Unknown (comments)     Patient stated had a reaction. Objective:   Physical Exam  General:  obese, alert, cooperative, no distress   Ears: Normal external ear canals AU   Sinuses: Normal paranasal sinuses without tenderness   Mouth:  Lips, mucosa, and tongue normal. Teeth and gums normal: oropharynx: clear   Neck: supple, symmetrical, trachea midline. Heart: normal rate, regular rhythm, normal S1, S2, no murmurs, rubs, clicks or gallops. Lungs: clear to auscultation bilaterally       Visit Vitals  /82 (BP 1 Location: Left arm, BP Patient Position: Sitting)   Pulse 90   Temp 97.2 °F (36.2 °C)   SpO2 98%         Disclaimer:        Medication risks/benefits/costs/interactions/alternatives discussed with patient. She was given an after visit summary which includes diagnoses, current medications, & vitals. She expressed understanding with the diagnosis and plan.       Aspects of this note may have been generated using voice recognition software. Despite editing, there may be some syntax errors.        Rafa Santa NP

## 2020-08-13 LAB — SARS-COV-2, NAA: NOT DETECTED

## 2020-11-30 ENCOUNTER — OFFICE VISIT (OUTPATIENT)
Dept: PRIMARY CARE CLINIC | Age: 25
End: 2020-11-30
Payer: COMMERCIAL

## 2020-11-30 VITALS
TEMPERATURE: 97.9 F | DIASTOLIC BLOOD PRESSURE: 86 MMHG | SYSTOLIC BLOOD PRESSURE: 142 MMHG | HEART RATE: 100 BPM | OXYGEN SATURATION: 99 %

## 2020-11-30 DIAGNOSIS — Z13.6 SCREENING FOR CARDIOVASCULAR, RESPIRATORY, AND GENITOURINARY DISEASES: Primary | ICD-10-CM

## 2020-11-30 DIAGNOSIS — Z13.83 SCREENING FOR CARDIOVASCULAR, RESPIRATORY, AND GENITOURINARY DISEASES: Primary | ICD-10-CM

## 2020-11-30 DIAGNOSIS — Z20.822 EXPOSURE TO 2019 NOVEL CORONAVIRUS: ICD-10-CM

## 2020-11-30 DIAGNOSIS — Z13.89 SCREENING FOR CARDIOVASCULAR, RESPIRATORY, AND GENITOURINARY DISEASES: Primary | ICD-10-CM

## 2020-11-30 DIAGNOSIS — R03.0 ELEVATED BLOOD PRESSURE READING: ICD-10-CM

## 2020-11-30 PROCEDURE — 99202 OFFICE O/P NEW SF 15 MIN: CPT | Performed by: NURSE PRACTITIONER

## 2020-11-30 NOTE — PROGRESS NOTES
Yennifer Rosario is a 30 y.o. female who was seen in clinic today (11/30/2020) for an acute visit. Assessment/Plan:            * COVID-19 sample collected and submitted       * Patient given detailed CDC instructions contained within After Visit Summary    Diagnoses and all orders for this visit:    1. Screening for cardiovascular, respiratory, and genitourinary diseases  -     NOVEL CORONAVIRUS (COVID-19)    2. Exposure to 2019 novel coronavirus    3. Elevated blood pressure reading    advised of bp being elevated and to f/u with her pcp  known covid exposure. Discussed expected course/resolution/complications of diagnosis in detail with patient. Advised pt on CDC guidance, OTC medications for symptom management, red flags reviewed and should develop to seek emergency medical attn. Reviewed with her that COVID-19 pandemic is an evolving situation with rapidly changing recommendations & guidelines, continue to practice hand hygiene, social distancing, wearing of facial coverings. Regardless of testing results, should still follow CDC guidelines as there is a chance of a false negative, such as a poor sample collection or being too soon to test after exposure. Medical decisions are made based on the the best information available at the time. Recommended she stay tuned for updates published by trusted sources and to advise your PCP of any unexpected changes in clinical condition     Subjective:   Lisette was seen today for Concern For COVID-19 (Coronavirus) (Patient reports + workplace exposure, patient has hx of asthma and has a c/o cough, headache, and fatigue x 3 days. )  She reports no change in symptoms since onset. She hasn't attempted any interventions. She denies a recent history/current: loss of smell/taste  fever, wheezing, SOB, and FINLEY. Non user of tob.   Has hx of seizure disorder     Travel Screening     Question   Response    In the last month, have you been in contact with someone who was confirmed or suspected to have Coronavirus / COVID-19? Yes    Have you had a COVID-19 viral test in the last 14 days? Do you have any of the following new or worsening symptoms? Cough; Severe headache; Fatigue    Have you traveled internationally in the last month? No      Travel History   Travel since 10/30/20     No documented travel since 10/30/20          ROS - Pertinent items are noted in HPI    Patient Active Problem List   Diagnosis Code    Spells of decreased attentiveness R68.89    Meenu vu seizure disorder ([facility] [address] G40.209    Depression F32.9    Anxiety F41.9     Home Medications    Medication Sig Start Date End Date Taking? Authorizing Provider   lacosamide (VIMPAT) 200 mg tab tablet Take 1 tablet by mouth twice a day 11/14/17   Jose Hernandez MD   lamoTRIgine (LAMICTAL) 25 mg tablet Take  by mouth daily. Provider, Historical   FLUTICASONE PROPIONATE (FLONASE NA) by Nasal route. Provider, Historical   albuterol (PROVENTIL VENTOLIN) 2.5 mg /3 mL (0.083 %) nebulizer solution by Nebulization route once. Provider, Historical      Allergies   Allergen Reactions    Keppra [Levetiracetam] Unknown (comments)     Patient stated had a reaction. Objective:   Physical Exam  General:  alert, cooperative, no distress   Ears: Normal external ear canals AU   Sinuses: Normal paranasal sinuses without tenderness   Mouth:  Lips, mucosa, and tongue normal. Teeth and gums normal: oropharynx: clear   Neck: supple, symmetrical, trachea midline. Heart: normal rate, regular rhythm, normal S1, S2, no murmurs, rubs, clicks or gallops. Lungs: clear to auscultation bilaterally       Visit Vitals  BP (!) 142/86 (BP 1 Location: Right arm, BP Patient Position: Sitting)   Pulse 100   Temp 97.9 °F (36.6 °C)   SpO2 99%         Disclaimer:        Medication risks/benefits/costs/interactions/alternatives discussed with patient.   She was given an after visit summary which includes diagnoses, current medications, & vitals. She expressed understanding with the diagnosis and plan. Aspects of this note may have been generated using voice recognition software. Despite editing, there may be some syntax errors.        Paxton Sarah NP

## 2020-12-02 ENCOUNTER — TELEPHONE (OUTPATIENT)
Dept: PRIMARY CARE CLINIC | Age: 25
End: 2020-12-02

## 2020-12-02 LAB — SARS-COV-2, NAA: NOT DETECTED

## 2023-05-24 RX ORDER — LAMOTRIGINE 25 MG/1
TABLET ORAL DAILY
COMMUNITY

## 2023-05-24 RX ORDER — ALBUTEROL SULFATE 2.5 MG/3ML
SOLUTION RESPIRATORY (INHALATION) ONCE
COMMUNITY

## 2023-05-24 RX ORDER — LACOSAMIDE 200 MG/1
1 TABLET ORAL 2 TIMES DAILY
COMMUNITY
Start: 2017-11-14